# Patient Record
Sex: MALE | Race: WHITE
[De-identification: names, ages, dates, MRNs, and addresses within clinical notes are randomized per-mention and may not be internally consistent; named-entity substitution may affect disease eponyms.]

---

## 2019-07-19 ENCOUNTER — HOSPITAL ENCOUNTER (EMERGENCY)
Dept: HOSPITAL 96 - M.ERS | Age: 39
Discharge: HOME | End: 2019-07-19
Payer: COMMERCIAL

## 2019-07-19 VITALS — DIASTOLIC BLOOD PRESSURE: 101 MMHG | SYSTOLIC BLOOD PRESSURE: 136 MMHG

## 2019-07-19 VITALS — HEIGHT: 68 IN | WEIGHT: 175 LBS | BODY MASS INDEX: 26.52 KG/M2

## 2019-07-19 DIAGNOSIS — Z88.8: ICD-10-CM

## 2019-07-19 DIAGNOSIS — Z88.2: ICD-10-CM

## 2019-07-19 DIAGNOSIS — K21.9: Primary | ICD-10-CM

## 2019-07-19 LAB
ABSOLUTE BASOPHILS: 0 THOU/UL (ref 0–0.2)
ABSOLUTE EOSINOPHILS: 0.3 THOU/UL (ref 0–0.7)
ABSOLUTE MONOCYTES: 0.6 THOU/UL (ref 0–1.2)
ALBUMIN SERPL-MCNC: 3.9 G/DL (ref 3.4–5)
ALP SERPL-CCNC: 85 U/L (ref 46–116)
ALT SERPL-CCNC: 28 U/L (ref 30–65)
ANION GAP SERPL CALC-SCNC: 7 MMOL/L (ref 7–16)
APTT BLD: 30.6 SECONDS (ref 25–31.3)
AST SERPL-CCNC: 15 U/L (ref 15–37)
BASOPHILS NFR BLD AUTO: 0.7 %
BILIRUB SERPL-MCNC: 0.4 MG/DL
BUN SERPL-MCNC: 16 MG/DL (ref 7–18)
CALCIUM SERPL-MCNC: 9.3 MG/DL (ref 8.5–10.1)
CHLORIDE SERPL-SCNC: 107 MMOL/L (ref 98–107)
CK-MB MASS: < 0.5 NG/ML
CO2 SERPL-SCNC: 30 MMOL/L (ref 21–32)
CREAT SERPL-MCNC: 1.1 MG/DL (ref 0.6–1.3)
EOSINOPHIL NFR BLD: 4.1 %
GLUCOSE SERPL-MCNC: 94 MG/DL (ref 70–99)
GRANULOCYTES NFR BLD MANUAL: 42.1 %
HCT VFR BLD CALC: 44 % (ref 42–52)
HGB BLD-MCNC: 15.1 GM/DL (ref 14–18)
INR PPP: 1
LIPASE: 296 U/L (ref 73–393)
LYMPHOCYTES # BLD: 3.2 THOU/UL (ref 0.8–5.3)
LYMPHOCYTES NFR BLD AUTO: 45.2 %
MAGNESIUM SERPL-MCNC: 2 MG/DL (ref 1.8–2.4)
MCH RBC QN AUTO: 28.2 PG (ref 26–34)
MCHC RBC AUTO-ENTMCNC: 34.4 G/DL (ref 28–37)
MCV RBC: 82.1 FL (ref 80–100)
MONOCYTES NFR BLD: 7.9 %
MPV: 9 FL. (ref 7.2–11.1)
NEUTROPHILS # BLD: 3 THOU/UL (ref 1.6–8.1)
NT-PRO BRAIN NAT PEPTIDE: 22 PG/ML (ref ?–300)
NUCLEATED RBCS: 0 /100WBC
PLATELET COUNT*: 197 THOU/UL (ref 150–400)
POTASSIUM SERPL-SCNC: 3.7 MMOL/L (ref 3.5–5.1)
PROT SERPL-MCNC: 7 G/DL (ref 6.4–8.2)
PROTHROMBIN TIME: 10.4 SECONDS (ref 9.2–11.5)
RBC # BLD AUTO: 5.36 MIL/UL (ref 4.5–6)
RDW-CV: 13.3 % (ref 10.5–14.5)
SODIUM SERPL-SCNC: 144 MMOL/L (ref 136–145)
TROPONIN-I LEVEL: <0.06 NG/ML (ref ?–0.06)
WBC # BLD AUTO: 7 THOU/UL (ref 4–11)

## 2019-07-22 NOTE — EKG
[place] [address]
Phone:  [phone]                     ELECTROCARDIOGRAM REPORT      
_______________________________________________________________________________
 
Name:       ALAN BAIN             Room:                      [facility]#:  [id number]      Account #:      [id number]  
Admission:  19     Attend Phys:                         
Discharge:  19     Date of Birth:  [birth year]  
         Report #: [id number]
    [id number]
_______________________________________________________________________________
THIS REPORT FOR:  //name//                      
 
                         [facility] ED
                                       
Test Date:    2019               Test Time:    18:41:43
Pat Name:     ALAN BAIN         Department:   
Patient ID:   [id number]            Room:          
Gender:       M                        Technician:   SUSAN
:          1980               Requested By: Charles Garcia
Order Number: [id number] MD:   Stefan Cantu
                                 Measurements
Intervals                              Axis          
Rate:         75                       P:            39
KS:           164                      QRS:          29
QRSD:         83                       T:            48
QT:           378                                    
QTc:          423                                    
                           Interpretive Statements
Sinus rhythm
Baseline wander in lead(s) V6
No previous ECG available for comparison
 
Electronically Signed On 2019 12:19:06 CDT by Stefan Cantu
[email or web address]
 
 
 
 
 
 
 
 
 
 
 
 
 
 
 
 
 
 
 
  <ELECTRONICALLY SIGNED>
                                           By: Stefan Cantu MD, [facility]      
  19     1219
D: 19 1841   _____________________________________
T: 19 1841   Stefan Cantu MD, FACC        /EPI

## 2019-12-21 ENCOUNTER — HOSPITAL ENCOUNTER (INPATIENT)
Dept: HOSPITAL 96 - M.ERS | Age: 39
LOS: 1 days | Discharge: HOME | DRG: 439 | End: 2019-12-22
Attending: INTERNAL MEDICINE | Admitting: INTERNAL MEDICINE
Payer: COMMERCIAL

## 2019-12-21 VITALS — DIASTOLIC BLOOD PRESSURE: 97 MMHG | SYSTOLIC BLOOD PRESSURE: 132 MMHG

## 2019-12-21 VITALS — DIASTOLIC BLOOD PRESSURE: 99 MMHG | SYSTOLIC BLOOD PRESSURE: 125 MMHG

## 2019-12-21 VITALS — SYSTOLIC BLOOD PRESSURE: 134 MMHG | DIASTOLIC BLOOD PRESSURE: 87 MMHG

## 2019-12-21 VITALS — WEIGHT: 175 LBS | HEIGHT: 69.02 IN | BODY MASS INDEX: 25.92 KG/M2

## 2019-12-21 DIAGNOSIS — E86.0: ICD-10-CM

## 2019-12-21 DIAGNOSIS — K21.9: ICD-10-CM

## 2019-12-21 DIAGNOSIS — Z88.2: ICD-10-CM

## 2019-12-21 DIAGNOSIS — K85.00: Primary | ICD-10-CM

## 2019-12-21 DIAGNOSIS — Z90.49: ICD-10-CM

## 2019-12-21 DIAGNOSIS — A08.4: ICD-10-CM

## 2019-12-21 DIAGNOSIS — R65.10: ICD-10-CM

## 2019-12-21 DIAGNOSIS — Z88.1: ICD-10-CM

## 2019-12-21 DIAGNOSIS — Z79.899: ICD-10-CM

## 2019-12-21 DIAGNOSIS — D72.829: ICD-10-CM

## 2019-12-21 LAB
ABSOLUTE BASOPHILS: 0 THOU/UL (ref 0–0.2)
ABSOLUTE EOSINOPHILS: 0 THOU/UL (ref 0–0.7)
ABSOLUTE MONOCYTES: 1.1 THOU/UL (ref 0–1.2)
ALBUMIN SERPL-MCNC: 4.4 G/DL (ref 3.4–5)
ALP SERPL-CCNC: 107 U/L (ref 46–116)
ALT SERPL-CCNC: 34 U/L (ref 30–65)
ANION GAP SERPL CALC-SCNC: 9 MMOL/L (ref 7–16)
AST SERPL-CCNC: 18 U/L (ref 15–37)
BASOPHILS NFR BLD AUTO: 0.2 %
BILIRUB SERPL-MCNC: 0.8 MG/DL
BILIRUB UR-MCNC: NEGATIVE MG/DL
BUN SERPL-MCNC: 18 MG/DL (ref 7–18)
CALCIUM SERPL-MCNC: 9.5 MG/DL (ref 8.5–10.1)
CHLORIDE SERPL-SCNC: 107 MMOL/L (ref 98–107)
CO2 SERPL-SCNC: 28 MMOL/L (ref 21–32)
COLOR UR: YELLOW
CREAT SERPL-MCNC: 1.2 MG/DL (ref 0.6–1.3)
EOSINOPHIL NFR BLD: 0.3 %
GLUCOSE SERPL-MCNC: 94 MG/DL (ref 70–99)
GRANULOCYTES NFR BLD MANUAL: 82.1 %
HCT VFR BLD CALC: 45.6 % (ref 42–52)
HGB BLD-MCNC: 15.8 GM/DL (ref 14–18)
KETONES UR STRIP-MCNC: (no result) MG/DL
LYMPHOCYTES # BLD: 1.1 THOU/UL (ref 0.8–5.3)
LYMPHOCYTES NFR BLD AUTO: 8.6 %
MCH RBC QN AUTO: 28.6 PG (ref 26–34)
MCHC RBC AUTO-ENTMCNC: 34.7 G/DL (ref 28–37)
MCV RBC: 82.7 FL (ref 80–100)
MONOCYTES NFR BLD: 8.8 %
MPV: 9.2 FL. (ref 7.2–11.1)
NEUTROPHILS # BLD: 10.3 THOU/UL (ref 1.6–8.1)
NUCLEATED RBCS: 0 /100WBC
PLATELET COUNT*: 192 THOU/UL (ref 150–400)
POTASSIUM SERPL-SCNC: 3.8 MMOL/L (ref 3.5–5.1)
PROT SERPL-MCNC: 7.9 G/DL (ref 6.4–8.2)
PROT UR QL STRIP: NEGATIVE
RBC # BLD AUTO: 5.52 MIL/UL (ref 4.5–6)
RBC # UR STRIP: NEGATIVE /UL
RDW-CV: 14.1 % (ref 10.5–14.5)
SODIUM SERPL-SCNC: 144 MMOL/L (ref 136–145)
SP GR UR STRIP: 1.01 (ref 1–1.03)
URINE CLARITY: CLEAR
URINE GLUCOSE-RANDOM: NEGATIVE
URINE LEUKOCYTES-REFLEX: NEGATIVE
URINE NITRITE-REFLEX: NEGATIVE
UROBILINOGEN UR STRIP-ACNC: 0.2 E.U./DL (ref 0.2–1)
WBC # BLD AUTO: 12.5 THOU/UL (ref 4–11)

## 2019-12-22 VITALS — SYSTOLIC BLOOD PRESSURE: 137 MMHG | DIASTOLIC BLOOD PRESSURE: 99 MMHG

## 2019-12-22 LAB
ABSOLUTE BASOPHILS: 0 THOU/UL (ref 0–0.2)
ABSOLUTE EOSINOPHILS: 0.2 THOU/UL (ref 0–0.7)
ABSOLUTE MONOCYTES: 0.7 THOU/UL (ref 0–1.2)
ALBUMIN SERPL-MCNC: 3.4 G/DL (ref 3.4–5)
ALP SERPL-CCNC: 93 U/L (ref 46–116)
ALT SERPL-CCNC: 23 U/L (ref 30–65)
ANION GAP SERPL CALC-SCNC: 9 MMOL/L (ref 7–16)
AST SERPL-CCNC: 13 U/L (ref 15–37)
BASOPHILS NFR BLD AUTO: 0.1 %
BILIRUB SERPL-MCNC: 0.7 MG/DL
BUN SERPL-MCNC: 14 MG/DL (ref 7–18)
CALCIUM SERPL-MCNC: 8 MG/DL (ref 8.5–10.1)
CHLORIDE SERPL-SCNC: 108 MMOL/L (ref 98–107)
CO2 SERPL-SCNC: 26 MMOL/L (ref 21–32)
CREAT SERPL-MCNC: 1 MG/DL (ref 0.6–1.3)
EOSINOPHIL NFR BLD: 2.1 %
GLUCOSE SERPL-MCNC: 96 MG/DL (ref 70–99)
GRANULOCYTES NFR BLD MANUAL: 69.3 %
HCT VFR BLD CALC: 39.6 % (ref 42–52)
HGB BLD-MCNC: 13.9 GM/DL (ref 14–18)
LIPASE: 412 U/L (ref 73–393)
LYMPHOCYTES # BLD: 1.6 THOU/UL (ref 0.8–5.3)
LYMPHOCYTES NFR BLD AUTO: 20.2 %
MCH RBC QN AUTO: 29.1 PG (ref 26–34)
MCHC RBC AUTO-ENTMCNC: 35.1 G/DL (ref 28–37)
MCV RBC: 82.7 FL (ref 80–100)
MONOCYTES NFR BLD: 8.3 %
MPV: 9.1 FL. (ref 7.2–11.1)
NEUTROPHILS # BLD: 5.5 THOU/UL (ref 1.6–8.1)
NUCLEATED RBCS: 0 /100WBC
PLATELET COUNT*: 164 THOU/UL (ref 150–400)
POTASSIUM SERPL-SCNC: 3.7 MMOL/L (ref 3.5–5.1)
PROT SERPL-MCNC: 6.1 G/DL (ref 6.4–8.2)
RBC # BLD AUTO: 4.79 MIL/UL (ref 4.5–6)
RDW-CV: 14.1 % (ref 10.5–14.5)
SODIUM SERPL-SCNC: 143 MMOL/L (ref 136–145)
WBC # BLD AUTO: 8 THOU/UL (ref 4–11)

## 2019-12-23 NOTE — CON
[address]  [address]                    CONSULTATION                  
_______________________________________________________________________________
 
Name:       ALAN BAIN             Room:           [address] [place]  
..#:  [id number]      Account #:      [id number]  
Admission:  12/21/19     Attend Phys:    Glen Ortiz MD 
Discharge:  12/22/19     Date of Birth:  [birth year]  
         Report #: [id number]
                                                                     [id number]  
_______________________________________________________________________________
THIS REPORT FOR:  //name//                      
 
CC: Glen Ortiz
    [facility] physician/PCP
 
DATE OF SERVICE:  12/22/2019
 
 
HISTORY OF PRESENT ILLNESS:  This is a pleasant 39-year-old gentleman with no
significant past medical history who is presenting for abdominal pain, nausea,
vomiting.  The patient reports the symptoms began yesterday and lasted for about
3 hours.  The patient reports began with nausea and vomiting and subsequently
developed abdominal pain.  The patient reports the pain was located in the
epigastric region, localized, nonradiating, not associated with any particular
aggravating or relieving factors.  The pain has since subsided after receiving
fluids and pain medications in the hospital.  The patient also reports an
intense 3-hour period of vomiting where he may have thrown up between 10 or 12
times.  He reports vomiting, mostly food and clear fluid.  Denies any
hematemesis, hematochezia.  The patient denies any weight loss, diarrhea or
other symptoms.  The patient reports he had somewhat similar symptoms about 10
years back, but he cannot recollect if he was diagnosed with pancreatitis.  He
denies any sick contacts or recent travel.
 
PAST MEDICAL HISTORY:  Nonsignificant.
 
PAST SURGICAL HISTORY:  The patient had appendectomy in high school.
 
SOCIAL HISTORY:  The patient denies smoking, alcohol or recreational drug use.
 
FAMILY HISTORY:  No family history of colon cancer or Hyman related neoplasia.
 
REVIEW OF SYSTEMS:  Negative except for what was mentioned in the HPI.
 
PHYSICAL EXAMINATION:
VITAL SIGNS:  Temperature 36.7, pulse rate 93, respirations 14, blood pressure
132/97, pulse ox 99% on room air.
GENERAL:  The patient is alert, awake, oriented x 3.
HEENT:  Pupils are equal, round, reactive to light and accommodation.  Mucous
membranes are moist.  There is no congestion.
LUNGS:  Clear to auscultation bilaterally.
CARDIOVASCULAR:  Rate and rhythm regular, S1, S2 present.
ABDOMEN:  Soft.  There is no distention, guarding or rigidity.
EXTREMITIES:  Warm, well perfused.  There is no edema.
SKIN:  Warm and dry.
 
LABORATORY DATA:  Hemoglobin 13.9, hematocrit 39.6, platelet count 164, WBC
 
 
 
[place] [address]                    CONSULTATION                  
_______________________________________________________________________________
 
Name:       ALAN BAIN             Room:           [address]#:  [id number]      Account #:      [id number]  
Admission:  12/21/19     Attend Phys:    Glen Ortiz MD 
Discharge:  12/22/19     Date of Birth:  [birth year]  
         Report #: [id number]
                                                                     [id number]  
_______________________________________________________________________________
count 8.0.  Sodium 143, potassium 3.7, chloride 108, bicarbonate 26, BUN 14,
creatinine 1.0, total bilirubin 0.7, AST 13, ALT 23, alkaline phosphatase 93. 
Lipase on presentation 3309 and subsequently 412.
 
IMAGING:  Abdomen and pelvis CT, this demonstrates borderline low-grade
gastroenteritis.  No free fluid, no bowel obstruction, with small fat containing
umbilical hernia.
 
ASSESSMENT AND PLAN:  Pleasant 39-year-old gentleman with self-limited episode
of acute idiopathic pancreatitis, presenting for evaluation.  I would recommend
checking IgG subclasses to rule out autoimmune pancreatitis.  Advance diet as
tolerated.  Okay to discharge, if the patient is able to tolerate a diet.  I
will set up outpatient EUS in 4 weeks for idiopathic pancreatitis.
 
 
 
 
 
 
 
 
 
 
 
 
 
 
 
 
 
 
 
 
 
 
 
 
 
 
 
 
 
 
 
<ELECTRONICALLY SIGNED>
                                        By:  Young Pat MD         
12/23/19     1814
D: 12/22/19 1129_______________________________________
T: 12/22/19 1204Young Pat MD            /nt